# Patient Record
Sex: FEMALE | Race: AMERICAN INDIAN OR ALASKA NATIVE
[De-identification: names, ages, dates, MRNs, and addresses within clinical notes are randomized per-mention and may not be internally consistent; named-entity substitution may affect disease eponyms.]

---

## 2018-03-28 ENCOUNTER — HOSPITAL ENCOUNTER (INPATIENT)
Dept: HOSPITAL 5 - TRG | Age: 28
LOS: 2 days | Discharge: HOME | End: 2018-03-30
Attending: OBSTETRICS & GYNECOLOGY | Admitting: OBSTETRICS & GYNECOLOGY
Payer: MEDICAID

## 2018-03-28 DIAGNOSIS — Z3A.39: ICD-10-CM

## 2018-03-28 LAB
BENZODIAZEPINES SCREEN,URINE: (no result)
BILIRUB UR QL STRIP: (no result)
BLOOD UR QL VISUAL: (no result)
HCT VFR BLD CALC: 33.7 % (ref 30.3–42.9)
HCT VFR BLD CALC: 38.9 % (ref 30.3–42.9)
HGB BLD-MCNC: 11.6 GM/DL (ref 10.1–14.3)
HGB BLD-MCNC: 13.1 GM/DL (ref 10.1–14.3)
MCH RBC QN AUTO: 31 PG (ref 28–32)
MCHC RBC AUTO-ENTMCNC: 34 % (ref 30–34)
MCV RBC AUTO: 92 FL (ref 79–97)
METHADONE SCREEN,URINE: (no result)
OPIATE SCREEN,URINE: (no result)
PH UR STRIP: 6 [PH] (ref 5–7)
PLATELET # BLD: 230 K/MM3 (ref 140–440)
PROT UR STRIP-MCNC: (no result) MG/DL
RBC # BLD AUTO: 4.24 M/MM3 (ref 3.65–5.03)
RBC #/AREA URNS HPF: 13 /HPF (ref 0–6)
UROBILINOGEN UR-MCNC: < 2 MG/DL (ref ?–2)
WBC #/AREA URNS HPF: 1 /HPF (ref 0–6)

## 2018-03-28 PROCEDURE — 86803 HEPATITIS C AB TEST: CPT

## 2018-03-28 PROCEDURE — 87806 HIV AG W/HIV1&2 ANTB W/OPTIC: CPT

## 2018-03-28 PROCEDURE — 81001 URINALYSIS AUTO W/SCOPE: CPT

## 2018-03-28 PROCEDURE — 85027 COMPLETE CBC AUTOMATED: CPT

## 2018-03-28 PROCEDURE — 88307 TISSUE EXAM BY PATHOLOGIST: CPT

## 2018-03-28 PROCEDURE — 86850 RBC ANTIBODY SCREEN: CPT

## 2018-03-28 PROCEDURE — 86706 HEP B SURFACE ANTIBODY: CPT

## 2018-03-28 PROCEDURE — 80307 DRUG TEST PRSMV CHEM ANLYZR: CPT

## 2018-03-28 PROCEDURE — 86592 SYPHILIS TEST NON-TREP QUAL: CPT

## 2018-03-28 PROCEDURE — 85660 RBC SICKLE CELL TEST: CPT

## 2018-03-28 PROCEDURE — 99211 OFF/OP EST MAY X REQ PHY/QHP: CPT

## 2018-03-28 PROCEDURE — 36415 COLL VENOUS BLD VENIPUNCTURE: CPT

## 2018-03-28 PROCEDURE — 86901 BLOOD TYPING SEROLOGIC RH(D): CPT

## 2018-03-28 PROCEDURE — 86762 RUBELLA ANTIBODY: CPT

## 2018-03-28 PROCEDURE — 86900 BLOOD TYPING SEROLOGIC ABO: CPT

## 2018-03-28 PROCEDURE — 85018 HEMOGLOBIN: CPT

## 2018-03-28 PROCEDURE — 10907ZC DRAINAGE OF AMNIOTIC FLUID, THERAPEUTIC FROM PRODUCTS OF CONCEPTION, VIA NATURAL OR ARTIFICIAL OPENING: ICD-10-PCS | Performed by: OBSTETRICS & GYNECOLOGY

## 2018-03-28 PROCEDURE — 85014 HEMATOCRIT: CPT

## 2018-03-28 PROCEDURE — G0463 HOSPITAL OUTPT CLINIC VISIT: HCPCS

## 2018-03-28 RX ADMIN — HYDROCODONE BITARTRATE AND ACETAMINOPHEN PRN EACH: 5; 325 TABLET ORAL at 23:16

## 2018-03-28 RX ADMIN — IBUPROFEN SCH MG: 600 TABLET, FILM COATED ORAL at 18:52

## 2018-03-28 RX ADMIN — IBUPROFEN SCH MG: 600 TABLET, FILM COATED ORAL at 07:34

## 2018-03-28 RX ADMIN — HYDROCODONE BITARTRATE AND ACETAMINOPHEN PRN EACH: 5; 325 TABLET ORAL at 07:33

## 2018-03-28 RX ADMIN — IBUPROFEN SCH: 600 TABLET, FILM COATED ORAL at 12:00

## 2018-03-28 NOTE — PROCEDURE NOTE
OB Delivery Note





- Delivery


Date of Delivery: 18


Surgeon: WILIAM ALVAREZ


Estimated blood loss: 200cc





- Vaginal


Delivery presentation: vertex


Delivery position: OA


Intrapartum events: no prenatal care, meconium


Delivery induction: none


Delivery augmentation: rupture of membranes


Delivery monitor: external FHT, external uterine


Route of delivery: 


Delivery placenta: spontaneous


Delivery cord: 3 umbilical vessels


Episiotomy: none


Delivery laceration: none


Delivery comments: 


Patient was noted to be c/c/0 and arom with meconium light. Peds called for 

mary. She commenced to pushing a viable female infant in OA presentation 

at 0625 with shoulders easily delivered and placed on mom chest. Nasopharynx 

and oropharynx suctions while peds assessing baby. the cord was clamped and 

cut. The placenta delivered intact with 3 vessel cord. Weight of female 6 

pounds and 14 oz. EBL 200cc. no lacs. patient tolerated procedure well.   








- Infant


  ** A


Apgar at 1 minute: 8


Apgar at 5 minutes: 9


Infant Gender: Female

## 2018-03-28 NOTE — HISTORY AND PHYSICAL REPORT
History of Present Illness


Date of examination: 18


Date of admission: 


18 05:56





Chief complaint: 





contractions





History of present illness: 





This is a 27 yo  at 39 weeks came here in labor presented as 9cm. She has 

no prenatal care. 





Past History


Past Medical History: no pertinent history


Past Surgical History: no surgical history


Social history: single.  denies: smoking, alcohol abuse, prescription drug abuse





- Obstetrical History


Expected Date of Delivery: 18


Actual Gestation: 39 Week(s) 3 Day(s) 


: 4


Para: 3


Hx # Term Pregnancies: 1


Number of  Pregnancies: 2


Spontaneous Abortions: 0


Induced : 0





Review of Systems


All systems: negative


Genitourinary: contractions





- Vital Signs


Vital signs: 


 Vital Signs











Pulse Pulse Ox


 


 78   100 


 


 18 05:54  18 05:54








 











Temp Pulse Resp BP Pulse Ox


 


    156 H        83 L


 


    18 05:56        18 05:56














- Physical Exam


Breasts: Positive: normal


Cardiovascular: Regular rate, Normal S1


Lungs: Positive: Clear to auscultation, Normal air movement


Abdomen: Positive: normal appearance, soft, normal bowel sounds.  Negative: 

distention, tenderness, guarding


Genitourinary (Female): Positive: normal external genitalia, normal perenium


Uterus: Positive: normal size


Anus/Rectum: Positive: normal perianal skin


Extremities: Positive: normal


Deep Tendon Reflex Grade: Normal +2





- Obstetrical


FHR: category 1


Uterine Contraction Monitor Mode: Palpation


Cervical Dilatation: 9


Cervical Effacement Percentage: 100


Fetal station: -1


Uterine Contraction Pattern: Regular


Uterine Tone Measurement Phase: Contraction


Uterine Contraction Intensity: Strong/Firm





Results


All other labs normal.








Assessment and Plan





A/P IUP 39 weeks


      No prenatal care 


      GBS unknown


      expect vaginal delivery

## 2018-03-29 RX ADMIN — DOCUSATE SODIUM SCH: 100 CAPSULE, LIQUID FILLED ORAL at 00:46

## 2018-03-29 RX ADMIN — HYDROCODONE BITARTRATE AND ACETAMINOPHEN PRN EACH: 5; 325 TABLET ORAL at 14:57

## 2018-03-29 RX ADMIN — DOCUSATE SODIUM SCH MG: 100 CAPSULE, LIQUID FILLED ORAL at 21:56

## 2018-03-29 RX ADMIN — IBUPROFEN SCH MG: 600 TABLET, FILM COATED ORAL at 14:56

## 2018-03-29 RX ADMIN — DOCUSATE SODIUM SCH MG: 100 CAPSULE, LIQUID FILLED ORAL at 10:59

## 2018-03-29 RX ADMIN — IBUPROFEN SCH MG: 600 TABLET, FILM COATED ORAL at 06:24

## 2018-03-29 RX ADMIN — IBUPROFEN SCH: 600 TABLET, FILM COATED ORAL at 00:00

## 2018-03-29 NOTE — PROGRESS NOTE
Assessment and Plan





O: VSS AF


PP H/H: 11.6/33.7


A: Stable PP Day1


No prenatal care


P: Discharge home with baby





Subjective





- Subjective


Date of service: 18


Patient reports: appetite normal, voiding normally, pain well controlled, flatus

, ambulating normally


[place]: doing well, bottle feeding





Objective





- Vital Signs


Latest vital signs: 


 Vital Signs











  Temp Pulse Resp BP BP Pulse Ox


 


 18 06:24    18   


 


 18 00:16    18   


 


 18 00:00  98.2 F  67  18   115/73 


 


 18 23:16    16   


 


 18 17:13  99.1 F  72  18  102/64   100


 


 18 13:09  98.4 F  80  18  109/58   99


 


 18 09:08  98.4 F  77  18  108/56   100








 Intake and Output











 18





 22:59 06:59 14:59


 


Intake Total 480 240 


 


Output Total 1500  


 


Balance -1020 240 


 


Intake:   


 


  Oral 480 240 


 


Output:   


 


  Urine 1500  


 


    Void 1500  


 


Other:   


 


  Total, Intake Amount 480 240 


 


  Total, Output Amount 600  


 


  # Voids   


 


    Void 2 1 














- Exam


Breasts: Present: deferred


Lungs: Present: Normal air movement


Abdomen: Present: normal appearance, soft.  Absent: distention, tenderness


Uterus: Present: normal, firm, fundal height below umbilicus.  Absent: bogginess

, tenderness


Extremities: Present: normal

## 2018-03-29 NOTE — DISCHARGE SUMMARY
Providers





- Providers


Date of Admission: 


18 05:56





Date of discharge: 18


Attending physician: 


WILIAM ALVAREZ MD





Primary care physician: 


WILIAM ALVAREZ MD








Hospitalization


Reason for admission: IUP at term


Delivery: , other (no prenatal care)


Episiotomy: none


Laceration: none


Other postpartum procedures: none


Postpartum complications: none


Discharge diagnosis: IUP at term delivered


 baby: female


Condition at discharge: Good


Disposition: DC-01 TO HOME OR SELFCARE





Plan





- Discharge Medications


Prescriptions: 


Ibuprofen [Motrin 600 MG tab] 600 mg PO Q6H PRN #30 tablet


 PRN Reason: Pain





- Provider Discharge Summary


Activity: routine, no sex for 6 weeks, no heavy lifting 4 weeks, no strenuous 

exercise, other


Diet: routine


Instructions: routine


Additional instructions: 


[]  Smoking cessation referral if applicable(refer to patient education folder 

for contact #)


[]  Refer to [facility] Booklet








Call your doctor immediately for:


* Fever > 100.5


* Heavy vaginal bleeding ( >1 pad per hour)


* Severe persistent headache


* Shortness of breath


* Reddened, hot, painful area to leg or breast


* Drainage or odor from incision.





* Keep incision clean and dry at all times and follow doctor's instructions 

regarding bathing/showering











- Follow up plan


Follow up: 


WILIAM ALVAREZ MD [Primary Care Provider] - 


(RTO to office 4 weeks postpartum


[facility]


[address] [place] [address])

## 2018-03-30 VITALS — DIASTOLIC BLOOD PRESSURE: 71 MMHG | SYSTOLIC BLOOD PRESSURE: 112 MMHG

## 2018-03-30 RX ADMIN — IBUPROFEN SCH MG: 600 TABLET, FILM COATED ORAL at 00:09

## 2018-03-30 RX ADMIN — IBUPROFEN SCH MG: 600 TABLET, FILM COATED ORAL at 06:07

## 2021-07-24 ENCOUNTER — HOSPITAL ENCOUNTER (EMERGENCY)
Dept: HOSPITAL 5 - ED | Age: 31
Discharge: HOME | End: 2021-07-24
Payer: MEDICAID

## 2021-07-24 VITALS — SYSTOLIC BLOOD PRESSURE: 147 MMHG | DIASTOLIC BLOOD PRESSURE: 76 MMHG

## 2021-07-24 DIAGNOSIS — Z79.899: ICD-10-CM

## 2021-07-24 DIAGNOSIS — O26.892: Primary | ICD-10-CM

## 2021-07-24 DIAGNOSIS — R10.9: ICD-10-CM

## 2021-07-24 DIAGNOSIS — Z3A.14: ICD-10-CM

## 2021-07-24 LAB
ALBUMIN SERPL-MCNC: 3.4 G/DL (ref 3.9–5)
ALT SERPL-CCNC: 7 UNITS/L (ref 7–56)
BACTERIA #/AREA URNS HPF: (no result) /HPF
BASOPHILS # (AUTO): 0.1 K/MM3 (ref 0–0.1)
BASOPHILS NFR BLD AUTO: 0.7 % (ref 0–1.8)
BILIRUB UR QL STRIP: (no result)
BLOOD UR QL VISUAL: (no result)
BUN SERPL-MCNC: 5 MG/DL (ref 7–17)
BUN/CREAT SERPL: 13 %
CALCIUM SERPL-MCNC: 8.7 MG/DL (ref 8.4–10.2)
EOSINOPHIL # BLD AUTO: 0.1 K/MM3 (ref 0–0.4)
EOSINOPHIL NFR BLD AUTO: 1.6 % (ref 0–4.3)
HCT VFR BLD CALC: 35.2 % (ref 30.3–42.9)
HEMOLYSIS INDEX: 3
HGB BLD-MCNC: 11.6 GM/DL (ref 10.1–14.3)
LYMPHOCYTES # BLD AUTO: 2.1 K/MM3 (ref 1.2–5.4)
LYMPHOCYTES NFR BLD AUTO: 26.9 % (ref 13.4–35)
MCHC RBC AUTO-ENTMCNC: 33 % (ref 30–34)
MCV RBC AUTO: 89 FL (ref 79–97)
MONOCYTES # (AUTO): 0.5 K/MM3 (ref 0–0.8)
MONOCYTES % (AUTO): 6.5 % (ref 0–7.3)
PH UR STRIP: 6 [PH] (ref 5–7)
PLATELET # BLD: 312 K/MM3 (ref 140–440)
PROT UR STRIP-MCNC: (no result) MG/DL
RBC # BLD AUTO: 3.95 M/MM3 (ref 3.65–5.03)
RBC #/AREA URNS HPF: 2 /HPF (ref 0–6)
UROBILINOGEN UR-MCNC: < 2 MG/DL (ref ?–2)
WBC #/AREA URNS HPF: 1 /HPF (ref 0–6)

## 2021-07-24 PROCEDURE — 80053 COMPREHEN METABOLIC PANEL: CPT

## 2021-07-24 PROCEDURE — 76805 OB US >/= 14 WKS SNGL FETUS: CPT

## 2021-07-24 PROCEDURE — 85025 COMPLETE CBC W/AUTO DIFF WBC: CPT

## 2021-07-24 PROCEDURE — 99284 EMERGENCY DEPT VISIT MOD MDM: CPT

## 2021-07-24 PROCEDURE — 81025 URINE PREGNANCY TEST: CPT

## 2021-07-24 PROCEDURE — 36415 COLL VENOUS BLD VENIPUNCTURE: CPT

## 2021-07-24 PROCEDURE — 84702 CHORIONIC GONADOTROPIN TEST: CPT

## 2021-07-24 PROCEDURE — 81001 URINALYSIS AUTO W/SCOPE: CPT

## 2021-07-24 NOTE — ULTRASOUND REPORT
ULTRASOUND OBSTETRIC 



INDICATION:

Pelvic pain.

Clinical Gestational Age (GA): 17 weeks



TECHNIQUE:

Transabdominal.



COMPARISON:

None available.



FINDINGS:

There is a single intrauterine pregnancy. 



Biparietal Diameter = 3.70 cm = 17 weeks, 2 day(s).

Head Circumference = 14.20 cm = 17 weeks, 3 day(s).

Abdominal Circumference = 11.93 cm = 17 weeks, 4 day(s).

Femur Length = 2.57 cm = 17 weeks, 6 day(s).

Average Ultrasound Age (AUA) = 17 weeks, 4 day(s).



Fetal Heart Rate: 156  beats per minute. 

Estimated Fetal Birth Weight in grams (if calculated): 205

Estimated Fetal Weight Growth Percentile (if calculated): 51



Fetal Position: cephalic. 

Cervix: closed.  Length in cm (if measured): 3.0

Placenta: anterior, grade 0 and free of the os.

Amniotic Fluid Volume: normal 

Amniotic Fluid Index (JOSUE) in cm (if calculated): Not calculated.

Maternal Adnexa: No significant abnormality.



IMPRESSION:

1. Single, living intrauterine pregnancy with estimated sonographic age of 17 weeks, 4  day(s).

2. No significant sonographic abnormality.



Signer Name: Markie Pop MD 

Signed: 7/24/2021 10:30 PM

Workstation Name: [id number]

## 2021-07-24 NOTE — EMERGENCY DEPARTMENT REPORT
ED Female  HPI





- General


Chief complaint: Urogenital-Female


Stated complaint: VAGINAL DISCHARGE 17WKS


Source: patient


Mode of arrival: Wheelchair


Limitations: No Limitations





- History of Present Illness


Initial comments: 





Patient is a A1 31-year-old -American female who is approximately 17 

weeks gestation presents to the ED with complaint of acute onset persistent p

elvic pain and vaginal discharge which she describes as white in color for the 

last 2 days.  Patient states that the pain is especially worse with any movement

in her pelvic area.  Patient denies vaginal bleeding, dysuria, urinary frequency

and urgency, nausea, vomiting, diarrhea, constipation, chest pain, shortness of 

breath, fever, chills, low back pain or hematuria.


MD Complaint: vaginal discharge, pelvic pain, other (17 weeks gestation)


-: Sudden, days(s) (2)


Location: suprapubic


Radiation: non-radiating


Severity: moderate


Severity scale (0 -10): 6


Quality: cramping, sharp


Consistency: constant


Improves with: none


Worsens with: movement


Are you Pregnant Now?: Yes (17 weeks gestation)


Associated Symptoms: denies other symptoms, vaginal discharge, abdominal pain 

(Suprapubic pain).  denies: vaginal bleeding, nausea/vomiting, fever/chills, 

headaches, loss of appetite, dysuria, hematuria, rash, seizure, shortness of 

breath, syncope, weakness, other





- Related Data


Sexually active: Yes


: 7


Para: 5


A: 1


                                  Previous Rx's











 Medication  Instructions  Recorded  Last Taken  Type


 


Ibuprofen [Motrin 600 MG tab] 600 mg PO Q6H PRN #30 tablet 18 Unknown Rx


 


Acetaminophen [Tylenol] 500 mg PO Q6HR PRN #30 tablet 21 Unknown Rx











                                    Allergies











Allergy/AdvReac Type Severity Reaction Status Date / Time


 


No Known Drug Allergies Allergy  Unknown Verified 18 06:43














ED Review of Systems


ROS: 


Stated complaint: VAGINAL DISCHARGE 17WKS


Other details as noted in HPI





Constitutional: denies: chills, fever


Eyes: denies: eye pain, eye discharge, vision change


ENT: denies: ear pain, throat pain


Respiratory: denies: cough, shortness of breath, wheezing


Cardiovascular: denies: chest pain, palpitations


Endocrine: no symptoms reported


Gastrointestinal: abdominal pain (Pelvic pain).  denies: nausea, vomiting, 

diarrhea


Genitourinary: discharge (Clear white vaginal discharge).  denies: urgency, 

dysuria


Musculoskeletal: denies: back pain, joint swelling, arthralgia


Skin: denies: rash, lesions


Neurological: denies: headache, weakness, paresthesias


Psychiatric: denies: anxiety, depression


Hematological/Lymphatic: denies: easy bleeding, easy bruising





ED Past Medical Hx





- Past Medical History


Previous Medical History?: No


Hx Hypertension: No


Hx Congestive Heart Failure: No


Hx Diabetes: No


Hx Deep Vein Thrombosis: No


Hx Renal Disease: No


Hx Sickle Cell Disease: No


Hx Seizures: No


Hx Asthma: No


Hx COPD: No





- Social History


Smoking Status: Never Smoker





- Medications


Home Medications: 


                                Home Medications











 Medication  Instructions  Recorded  Confirmed  Last Taken  Type


 


Ibuprofen [Motrin 600 MG tab] 600 mg PO Q6H PRN #30 tablet 18  Unknown Rx


 


Acetaminophen [Tylenol] 500 mg PO Q6HR PRN #30 tablet 21  Unknown Rx














ED Physical Exam





- General


Limitations: No Limitations


General appearance: alert, in no apparent distress





- Head


Head exam: Present: atraumatic, normocephalic, normal inspection





- Eye


Eye exam: Present: normal appearance, PERRL, EOMI


Pupils: Present: normal accommodation





- ENT


ENT exam: Present: normal exam, normal orophraynx, mucous membranes moist, TM's 

normal bilaterally, normal external ear exam





- Neck


Neck exam: Present: normal inspection, full ROM





- Respiratory


Respiratory exam: Present: normal lung sounds bilaterally.  Absent: respiratory 

distress, wheezes, rales, rhonchi, chest wall tenderness, accessory muscle use, 

decreased breath sounds, prolonged expiratory





- Cardiovascular


Cardiovascular Exam: Present: regular rate, normal rhythm, normal heart sounds. 

Absent: systolic murmur, diastolic murmur, rubs, gallop





- GI/Abdominal


GI/Abdominal exam: Present: soft, tenderness (Palpable mild suprapubic 

tenderness no guarding or rebound), normal bowel sounds.  Absent: guarding, 

rebound, hyperactive bowel sounds, hypoactive bowel sounds, organomegaly





- 


Bi-manual exam: Present: other (Pelvic exam deferred at this time, patient is an

inmate)





- Extremities Exam


Extremities exam: Present: normal inspection, full ROM, normal capillary refill





- Back Exam


Back exam: Present: normal inspection, full ROM.  Absent: tenderness, CVA 

tenderness (R), CVA tenderness (L), muscle spasm, paraspinal tenderness, 

vertebral tenderness





- Neurological Exam


Neurological exam: Present: alert, oriented X3, CN II-XII intact, normal gait, 

reflexes normal





- Psychiatric


Psychiatric exam: Present: normal affect, normal mood





- Skin


Skin exam: Present: warm, dry, intact, normal color.  Absent: rash





ED Course


                                   Vital Signs











  21





  19:09


 


Temperature 98.3 F


 


Pulse Rate 91 H


 


Respiratory 16





Rate 


 


Blood Pressure 135/77





[Right] 


 


O2 Sat by Pulse 100





Oximetry 














ED Medical Decision Making





- Lab Data


Result diagrams: 


                                 21 20:00





                                 21 20:00





- Radiology Data


Radiology results: report reviewed, image reviewed





[facility]  


                                     [address] [id number]  


 


                                         Ultrasound Report   


                                               Signed  


 


Patient: JAKE JOSEPH                                              

                  MR  


#: [id number]          


: 1990                                                                

Acct:[id number]      


 


Age/Sex: 31 / F                                                                

ADM Date: 21     


 


Loc: ED       


Attending Dr:   


 


 


Ordering Physician: ARTUR CROFT  


Date of Service: 21  


Procedure(s): US OB >= 14 weeks Fetus  


Accession Number(s): [id number]  


 


cc: ARTUR CROFT   


 


 


ULTRASOUND OBSTETRIC   


 


 INDICATION:  


 Pelvic pain.  


 Clinical Gestational Age (GA): 17 weeks  


 


 TECHNIQUE:  


 Transabdominal.  


 


 COMPARISON:  


 None available.  


 


 FINDINGS:  


 There is a single intrauterine pregnancy.   


 


 Biparietal Diameter = 3.70 cm = 17 weeks, 2 day(s).  


 Head Circumference = 14.20 cm = 17 weeks, 3 day(s).  


 Abdominal Circumference = 11.93 cm = 17 weeks, 4 day(s).  


 Femur Length = 2.57 cm = 17 weeks, 6 day(s).  


 Average Ultrasound Age (AUA) = 17 weeks, 4 day(s).  


 


 Fetal Heart Rate: 156  beats per minute.   


 Estimated Fetal Birth Weight in grams (if calculated): 205  


 Estimated Fetal Weight Growth Percentile (if calculated): 51  


 


 Fetal Position: cephalic.   


 Cervix: closed.  Length in cm (if measured): 3.0  


 Placenta: anterior, grade 0 and free of the os.  


 Amniotic Fluid Volume: normal   


 Amniotic Fluid Index (JOSUE) in cm (if calculated): Not calculated.  


 Maternal Adnexa: No significant abnormality.  


 


 IMPRESSION:  


 1. Single, living intrauterine pregnancy with estimated sonographic age of 17 

weeks, 4  day(s).  


 2. No significant sonographic abnormality.  


 


 Signer Name: Markie Pop MD   


 Signed: 2021 10:30 PM  


 Workstation Name: [id number]   


 


 


Transcribed By: MN  


Dictated By: Markie Pop MD  


Electronically Authenticated By: Markie Pop MD    


Signed Date/Time: 21                                


 


 


 


DD/DT: 21                                                            

  


TD/TT:























Print 





- Medical Decision Making





This is a A1 31-year-old -American female who is approximately 17 

weeks gestation presents to the ED with complaint of acute onset persistent 

pelvic pain and vaginal discharge which she describes as white in color for the 

last 2 days.  Patient states that the pain is especially worse with any movement

 in her pelvic area.  In the ED, patient is alert and oriented x3 and is not in 

any distress.  Patient was treated for pain in the ED with Tylenol.  Lab test 

results were reviewed and are all nonactionable.  Transvaginal ultrasound shows 

a single live IUP of approximately 17 weeks and 4 days, with a fetal heart rate 

of 156 bpm, and no acute abnormalities.  On reevaluation, patient's pain is well

 controlled medications.  Patient will discharge home and advised to take 

Tylenol as needed for pain, follow-up with your OB/GYN physician in 3 to 5 days 

for reevaluation or return to the ED immediately if symptoms get worse.





- Differential Diagnosis


UTI; Ovarian; Uterine fibroid; kidney stones; bacterial vaginosis


Critical care attestation.: 


If time is entered above; I have spent that time in minutes in the direct care 

of this critically ill patient, excluding procedure time.








ED Disposition


Clinical Impression: 


 Abdominal pain during pregnancy in second trimester





Disposition: DC-01 TO HOME OR SELFCARE


Is pt being admited?: No


Does the pt Need Aspirin: No


Condition: Stable


Instructions:  Abdominal Pain During Pregnancy, Easy-to-Read


Additional Instructions: 


All lab test results were reviewed and are all nonactionable.  The pelvic 

ultrasound showed a single live intrauterine pregnancy of approximately 17 weeks

 and 4 days with a fetal heart rate of 156 beats a minute, and no other acute 

abnormalities.  Therefore maintain the complete pelvic rest, take Tylenol as 

needed for pain and follow-up with your OB/GYN physician in 3 to 5 days for 

reevaluation.  Return to the ED immediately if symptoms get worse.


Prescriptions: 


Acetaminophen [Tylenol] 500 mg PO Q6HR PRN #30 tablet


 PRN Reason: Pain , Severe (7-10)


Referrals: 


SLOAN LOPEZ JR, MD [Staff Physician] - 3-5 Days


Time of Disposition: 22:44


Print Language: ENGLISH